# Patient Record
Sex: MALE | Race: WHITE | NOT HISPANIC OR LATINO | Employment: FULL TIME | URBAN - METROPOLITAN AREA
[De-identification: names, ages, dates, MRNs, and addresses within clinical notes are randomized per-mention and may not be internally consistent; named-entity substitution may affect disease eponyms.]

---

## 2018-07-26 ENCOUNTER — APPOINTMENT (OUTPATIENT)
Dept: GENERAL RADIOLOGY | Facility: CLINIC | Age: 23
End: 2018-07-26
Attending: PHYSICIAN ASSISTANT
Payer: COMMERCIAL

## 2018-07-26 ENCOUNTER — HOSPITAL ENCOUNTER (EMERGENCY)
Facility: CLINIC | Age: 23
Discharge: HOME | End: 2018-07-26
Attending: EMERGENCY MEDICINE | Admitting: EMERGENCY MEDICINE
Payer: COMMERCIAL

## 2018-07-26 VITALS
RESPIRATION RATE: 20 BRPM | BODY MASS INDEX: 25.2 KG/M2 | WEIGHT: 180 LBS | DIASTOLIC BLOOD PRESSURE: 75 MMHG | SYSTOLIC BLOOD PRESSURE: 140 MMHG | HEIGHT: 71 IN | OXYGEN SATURATION: 97 % | HEART RATE: 98 BPM | TEMPERATURE: 98.7 F

## 2018-07-26 DIAGNOSIS — S93.422A SPRAIN OF DELTOID LIGAMENT OF LEFT ANKLE, INITIAL ENCOUNTER: ICD-10-CM

## 2018-07-26 PROCEDURE — 25000132 ZZH RX MED GY IP 250 OP 250 PS 637: Performed by: PHYSICIAN ASSISTANT

## 2018-07-26 PROCEDURE — 73590 X-RAY EXAM OF LOWER LEG: CPT | Mod: RT

## 2018-07-26 PROCEDURE — 29515 APPLICATION SHORT LEG SPLINT: CPT | Mod: LT

## 2018-07-26 PROCEDURE — 73610 X-RAY EXAM OF ANKLE: CPT | Mod: RT

## 2018-07-26 PROCEDURE — 99284 EMERGENCY DEPT VISIT MOD MDM: CPT | Mod: 25

## 2018-07-26 RX ORDER — OXYCODONE HYDROCHLORIDE 5 MG/1
5-10 TABLET ORAL EVERY 6 HOURS PRN
Qty: 12 TABLET | Refills: 0 | Status: SHIPPED | OUTPATIENT
Start: 2018-07-26

## 2018-07-26 RX ORDER — ACETAMINOPHEN 325 MG/1
975 TABLET ORAL ONCE
Status: COMPLETED | OUTPATIENT
Start: 2018-07-26 | End: 2018-07-26

## 2018-07-26 RX ORDER — IBUPROFEN 600 MG/1
600 TABLET, FILM COATED ORAL ONCE
Status: COMPLETED | OUTPATIENT
Start: 2018-07-26 | End: 2018-07-26

## 2018-07-26 RX ADMIN — IBUPROFEN 600 MG: 600 TABLET, FILM COATED ORAL at 03:58

## 2018-07-26 RX ADMIN — ACETAMINOPHEN 975 MG: 325 TABLET, FILM COATED ORAL at 03:58

## 2018-07-26 ASSESSMENT — ENCOUNTER SYMPTOMS: ARTHRALGIAS: 1

## 2018-07-26 NOTE — ED PROVIDER NOTES
"  History   Chief Complaint:  Ankle Pain    HPI   Valerie Webster is an otherwise healthy 23 year old male who presents with ankle pain. The patient reports that while playing softball at 2100 tonight he twisted his right ankle while sliding into base.  Patient states that he planted his foot too early causing his weight to propel forward.  He notes this was painful, and he was unable to walk on the foot after the injury. He states the pain has worsened now, prompting him to come to the ED. The patient denies knee pain.     Allergies:  No Known Drug Allergies     Medications:    The patient is currently on no regular medications.     Past Medical History:    History reviewed. No pertinent past medical history.    Past Surgical History:    History reviewed. No pertinent surgical history.    Family History:    History reviewed. No pertinent family history.     Social History:  Alcohol Use: Yes  Marital Status: Single     Review of Systems   Musculoskeletal: Positive for arthralgias (Right ankle, negative for right knee).   All other systems reviewed and are negative.    Physical Exam   Patient Vitals for the past 24 hrs:   BP Temp Temp src Pulse Resp SpO2 Height Weight   07/26/18 0330 140/75 98.7  F (37.1  C) Temporal 98 20 97 % 1.803 m (5' 11\") 81.6 kg (180 lb)     Physical Exam  MSK:  Focused exam of the right ankle shows focal tenderness/swelling over the medial malleolus and deltoid ligament.  Tenderness to palpation over distal tibia and fibula, as well as mid tibia.  Tenderness to palpation over Achilles tendon but intact to palpation.  Negative Machado test.  No tenderness to palpation over bones of foot, calcaneus    SKIN:  Warm and dry with strong DP pulse and normal capillary refill.    NEURO:  Normal sensation throughout the foot and ankle.  Strength limited secondary to pain.    PSYCH:  Normal affect      Emergency Department Course   Imaging:  Radiographic findings were communicated with the patient " who voiced understanding of the findings.    XR Tibia & Fibula Right 2 Views:  No visualized acute fracture, malalignment or other acute  osseous abnormality of the right tibia, fibula or ankle.  As read by Radiology.    XR Ankle Right 3 Views:  No visualized acute fracture, malalignment or other acute  osseous abnormality of the right tibia, fibula or ankle.  As read by Radiology.    Interventions:  0358: Tylenol 975 mg PO  0358: Ibuprofen 600 mg PO    Emergency Department Course:  Past medical records, nursing notes, and vitals reviewed.  0341: I performed an exam of the patient and obtained history, as documented above.  The patient was sent for a tibia & fibula x-ray and right ankle x-ray while in the emergency department, findings above.    0445: I rechecked the patient. Explained findings to patient.    Findings and plan explained to the patient. Patient discharged home with instructions regarding supportive care, medications, and reasons to return. The importance of close follow-up was reviewed.     Impression & Plan    Medical Decision Making:  Valerie Webster is a 24 yo male who presents with ankle pain and swelling.  Patient history and records reviewed.  X-rays were obtained as above and negative for fracture or dislocation.  On examination the patient has swelling over deltoid ligament and tenderness to palpation in this region as well as diffusely throughout the posterior and medial high ankle.  Exam of foot and knee appears intact with no evidence of fracture or dislocation and there is no evidence of disruption of the Achilles tendon.    Patient was given symptomatic treatment as above.  He was placed in a gel splint of the ankle and given crutches with instruction to follow-up with orthopedics if symptoms not improving over the next 5-6 days.  He was discharged home with prescription for pain medication as below.  Return to ED if new or worsening symptoms.    Diagnosis:    ICD-10-CM   1. Sprain of  deltoid ligament of left ankle, initial encounter S93.422A       Disposition:  Discharged to home.    Discharge Medications:  New Prescriptions    OXYCODONE IR (ROXICODONE) 5 MG TABLET    Take 1-2 tablets (5-10 mg) by mouth every 6 hours as needed for pain         Rob Majano  7/26/2018   [facility] EMERGENCY DEPARTMENT  IRob, aracely serving as a scribe at 3:41 AM on 7/26/2018 to document services personally performed by Mic Krishnamurthy PA-C  based on my observations and the provider's statements to me.        Mic Krishnamurthy PA-C  07/26/18 0522

## 2018-07-26 NOTE — ED AVS SNAPSHOT
[facility] [place] [facility]    [address] [place] [address]    Phone:  [phone]    Fax:  [phone]                                       Valerie Webster   MRN: [id number]    Department:  [facility] [place] [facility]   Date of Visit:  7/26/2018           Patient Information     Date Of Birth          1995        Your diagnoses for this visit were:     Sprain of deltoid ligament of left ankle, initial encounter        You were seen by Antonio Norris MD.        Discharge Instructions       Please make an appointment to follow up with [facility] [phone] in 5-7 days if not improving.    When able, elevate your ankle above the level of your heart to help with swelling    Use ice bags for 15-20 minutes every 1-2 hours for next 12-24 hours to help with swelling    You can also use an ace bandage for comfort and help with swelling    Avoid activities that cause increased pain     gentle range of motion is okay, and actually helpful to get you back to work/school faster.    Weight bear as tolerated by pain, use crutches/boot until then        Use tylenol and/or ibuprofen for pain or discomfort    Use Oxycodone for severe pain uncontrolled by above medications    Opioid Medication Information  You have been given a prescription for an opioid (narcotic) pain medicine and/or have received a pain medicine while here in the emergency department. These medicines can make you drowsy or impaired. You must not drive, operate dangerous equipment, or engage in any other dangerous activities while taking these medications. If you drive while taking these medications, you could be arrested for DUI, or driving under the influence. Do not drink any alcohol while you are taking these medications.   Opioid pain medications can cause addiction. If you have a history of chemical dependency of any type, you are at a higher risk of becoming addicted to  pain medications. Only take these prescribed medications to treat your pain when all other options have been tried. Take it for as short a time and as few doses as possible. Store your pain pills in a secure place, as they are frequently stolen and provide a dangerous opportunity for children or visitors in your house to start abusing these powerful medications. We will not replace any lost or stolen medicine. As soon as your pain is better, you should flush all your remaining medication.   Many prescription pain medications contain Tylenol (acetaminophen), including Vicodin, Tylenol #3, Norco, Lortab, and Percocet. You should not take any extra pills of Tylenol if you are using these prescription medications or you can get very sick. Do not ever take more than 4000 mg of acetaminophen in any 24 hour period.  All opioids tend to cause constipation. Drink plenty of water and eat foods that have a lot of fiber, such as fruits, vegetables, prune juice, apple juice and high fiber cereal. Take a laxative if you don t move your bowels at least every other day. Miralax, Milk of Magnesia, Colace, or Senna can be used to keep you regular.            Discharge Instructions  Ankle Sprain    An ankle sprain is a stretching or tearing of a ligament around your ankle joint. In most cases, we recommend resting the ankle for about 3 days, followed by return to activity. Some severe sprains need longer periods of rest, or can require a cast or boot to immobilize them.    Generally, every Emergency Department visit should have a follow-up clinic visit with either a primary or a specialty clinic/provider. Please follow-up as instructed by your emergency provider today.    Return to the Emergency Department if:    Your pain is much worse, or if there is pain in a new area.    Your foot or leg becomes pale, cool, blue, or numb or tingling.    There is anything concerning to you about how your ankle looks.    Any splint or device is  feeling too tight, causing pain, or rubbing into your skin.    Follow-up with your provider:    As recommended by your emergency provider.    If your ankle is not back to normal within about 1 week.    If you are involved in significant athletic activities.        Treatment:    Apply ice your injured area for 15 minutes at a time, at least 3 times a day for the first 1-2 days. Use a cloth between the ice bag and your skin to prevent frostbite.     Do not sleep with an ice pack or heating pad on, since this can cause burns or skin injury.    Raise the injured area above the level of your heart as much as possible in the first 1-2 days.    Pain medications -- You may take a pain medication such as Tylenol  (acetaminophen), Advil , Nuprin  (ibuprofen) or Aleve  (naproxen).    Splint. We often give a stirrup-shaped ankle splint to support your ankle and prevent it from turning again. Wear this all the time for the first 3-5 days, and then as directed by your provider.    Crutches. If you cannot put weight on the ankle without a lot of pain, we recommend crutches. You can put as much weight on the ankle as possible without severe pain.     Compression. An elastic bandage (Ace  wrap) can help with pain and swelling. Remove this at least twice a day, and leave it off for several hours if you develop swelling of the foot.     Exercises.  Movements, like rotating the foot in circles, should be started when swelling improves.   If you were given a prescription for medicine here today, be sure to read all of the information (including the package insert) that comes with your prescription.  This will include important information about the medicine, its side effects, and any warnings that you need to know about.  The pharmacist who fills the prescription can provide more information and answer questions you may have about the medicine.  If you have questions or concerns that the pharmacist cannot address, please call or return  to the Emergency Department.  Remember that you can always come back to the Emergency Department if you are not able to see your regular provider in the amount of time listed above, if you get any new symptoms, or if there is anything that worries you.      24 Hour Appointment Hotline       To make an appointment at any [facility], call [phone] ([phone]). If you don't have a family doctor or clinic, we will help you find one. [facility] are conveniently located to serve the needs of you and your family.             Review of your medicines      START taking        Dose / Directions Last dose taken    oxyCODONE IR 5 MG tablet   Commonly known as:  ROXICODONE   Dose:  5-10 mg   Quantity:  12 tablet        Take 1-2 tablets (5-10 mg) by mouth every 6 hours as needed for pain   Refills:  0                Information about OPIOIDS     PRESCRIPTION OPIOIDS: WHAT YOU NEED TO KNOW   We gave you an opioid (narcotic) pain medicine. It is important to manage your pain, but opioids are not always the best choice. You should first try all the other options your care team gave you. Take this medicine for as short a time (and as few doses) as possible.     These medicines have risks:    DO NOT drive when on new or higher doses of pain medicine. These medicines can affect your alertness and reaction times, and you could be arrested for driving under the influence (DUI). If you need to use opioids long-term, talk to your care team about driving.    DO NOT operate heave machinery    DO NOT do any other dangerous activities while taking these medicines.     DO NOT drink any alcohol while taking these medicines.      If the opioid prescribed includes acetaminophen, DO NOT take with any other medicines that contain acetaminophen. Read all labels carefully. Look for the word  acetaminophen  or  Tylenol.  Ask your pharmacist if you have questions or are unsure.    You can get addicted to pain medicines, especially  if you have a history of addiction (chemical, alcohol or substance dependence). Talk to your care team about ways to reduce this risk.    Store your pills in a secure place, locked if possible. We will not replace any lost or stolen medicine. If you don t finish your medicine, please throw away (dispose) as directed by your pharmacist. The [place] [employer] has more information about safe disposal: https://www.pca.state.[place]/living-green/managing-unwanted-medications.     All opioids tend to cause constipation. Drink plenty of water and eat foods that have a lot of fiber, such as fruits, vegetables, prune juice, apple juice and high-fiber cereal. Take a laxative (Miralax, milk of magnesia, Colace, Senna) if you don t move your bowels at least every other day.         Prescriptions were sent or printed at these locations (1 Prescription)                   Other Prescriptions                Printed at Department/Unit printer (1 of 1)         oxyCODONE IR (ROXICODONE) 5 MG tablet                Procedures and tests performed during your visit     XR Ankle Right 3 Views    XR Tibia & Fibula Right 2 Views      Orders Needing Specimen Collection     None      Pending Results     Date and Time Order Name Status Description    7/26/2018 0349 XR Tibia & Fibula Right 2 Views Preliminary     7/26/2018 0349 XR Ankle Right 3 Views Preliminary             Pending Culture Results     No orders found from 7/24/2018 to 7/27/2018.            Pending Results Instructions     If you had any lab results that were not finalized at the time of your Discharge, you can call the ED Lab Result RN at [phone]. You will be contacted by this team for any positive Lab results or changes in treatment. The nurses are available 7 days a week from 10A to 6:30P.  You can leave a message 24 hours per day and they will return your call.        Test Results From Your Hospital Stay        7/26/2018  4:25 AM      Narrative     RIGHT  TIBIA AND FIBULA AND ANKLE 7 VIEWS  7/26/2018 4:12 AM     HISTORY: Tenderness over the distal tibia and fibula.    COMPARISON: None.        Impression     IMPRESSION: No visualized acute fracture, malalignment or other acute  osseous abnormality of the right tibia, fibula or ankle.         7/26/2018  4:25 AM      Narrative     RIGHT TIBIA AND FIBULA AND ANKLE 7 VIEWS  7/26/2018 4:12 AM     HISTORY: Tenderness over the distal tibia and fibula.    COMPARISON: None.        Impression     IMPRESSION: No visualized acute fracture, malalignment or other acute  osseous abnormality of the right tibia, fibula or ankle.                Clinical Quality Measure: Blood Pressure Screening     Your blood pressure was checked while you were in the emergency department today. The last reading we obtained was  BP: 140/75 . Please read the guidelines below about what these numbers mean and what you should do about them.  If your systolic blood pressure (the top number) is less than 120 and your diastolic blood pressure (the bottom number) is less than 80, then your blood pressure is normal. There is nothing more that you need to do about it.  If your systolic blood pressure (the top number) is 120-139 or your diastolic blood pressure (the bottom number) is 80-89, your blood pressure may be higher than it should be. You should have your blood pressure rechecked within a year by a primary care provider.  If your systolic blood pressure (the top number) is 140 or greater or your diastolic blood pressure (the bottom number) is 90 or greater, you may have high blood pressure. High blood pressure is treatable, but if left untreated over time it can put you at risk for heart attack, stroke, or kidney failure. You should have your blood pressure rechecked by a primary care provider within the next 4 weeks.  If your provider in the emergency department today gave you specific instructions to follow-up with your doctor or provider even sooner  "than that, you should follow that instruction and not wait for up to 4 weeks for your follow-up visit.        Thank you for choosing [facility]       Thank you for choosing [facility] for your care. Our goal is always to provide you with excellent care. Hearing back from our patients is one way we can continue to improve our services. Please take a few minutes to complete the written survey that you may receive in the mail after you visit with us. Thank you!        [email or web address] Information     [employer] lets you send messages to your doctor, view your test results, renew your prescriptions, schedule appointments and more. To sign up, go to [email or web address] . Click on \"Log in\" on the left side of the screen, which will take you to the Welcome page. Then click on \"Sign up Now\" on the right side of the page.     You will be asked to enter the access code listed below, as well as some personal information. Please follow the directions to create your username and password.     Your access code is: [email or web address]  Expires: 10/24/2018  4:47 AM     Your access code will  in 90 days. If you need help or a new code, please call your [facility] clinic or [phone].        Care EveryWhere ID     This is your Care EveryWhere ID. This could be used by other organizations to access your [facility] medical records  [id number]        Equal Access to Services     SHIRA MERINO : Hadii joseline ordonezo Sojasminali, waaxda luqadaha, qaybta kaalmada philipp, nydia coker. So [facility] [phone].    ATENCIÓN: Si habla español, tiene a lim disposición servicios gratuitos de asistencia lingüística. Gwendolyn joseph [phone].    We comply with applicable federal civil rights laws and [place] laws. We do not discriminate on the basis of race, color, national origin, age, disability, sex, sexual orientation, or gender identity.            After Visit Summary       This is your record. Keep this with you and show to your " community pharmacist(s) and doctor(s) at your next visit.

## 2018-07-26 NOTE — ED PROVIDER NOTES
Emergency Department Attending Supervision Note  7/26/2018  3:55 AM      I evaluated this patient in conjunction with Advanced Practice Clinician:    Mic Krishnamurthy PA-C    Briefly, the patient presented with left ankle injury that occurred during a softball game sliding into a base.  Pain is located inferior to the medial malleolus of the left ankle no associated injury able to weight-bear since the injury    My exam:    HEENT:  mmm  Neck: supple  CV: ppi, regular   Resp: speaking in full sentences with any resp distress  Ext:  L ANKLE    + TTP over medial malleolus  + TTP over lateral malleolus  noTTP base of 5th MT  noTTP fibular head  +soft tissue swelling present over medial malleolus  This is a closed injury   able to fire foot/toe flexors and extensors  sensation and perfusion intact throughout foot  No significant swelling or defect over the Achilles tendon.  Achilles gastrocsoleus complex intact  Remainder skeletal survey unremarkable    Skin: warm dry well perfused  Neuro: Alert, no gross motor or sensory deficits                    My impression/diagnosis is:  Left deltoid ligament injury radiograph negative discharged home crutches weightbearing as tolerated follow-up with [facility] not improving as expected for ankle sprain      ICD-10-CM    1. Sprain of deltoid ligament of left ankle, initial encounter S93.422A        Antonio Norris MD Walker, Jerome Richard, MD  07/26/18 0572

## 2018-07-26 NOTE — ED TRIAGE NOTES
Pt to ER with c/o right ankle pain pt was playing softball and twisted it last night now pain severe

## 2018-07-26 NOTE — DISCHARGE INSTRUCTIONS
Please make an appointment to follow up with [facility] [phone] in 5-7 days if not improving.    When able, elevate your ankle above the level of your heart to help with swelling    Use ice bags for 15-20 minutes every 1-2 hours for next 12-24 hours to help with swelling    You can also use an ace bandage for comfort and help with swelling    Avoid activities that cause increased pain     gentle range of motion is okay, and actually helpful to get you back to work/school faster.    Weight bear as tolerated by pain, use crutches/boot until then        Use tylenol and/or ibuprofen for pain or discomfort    Use Oxycodone for severe pain uncontrolled by above medications    Opioid Medication Information  You have been given a prescription for an opioid (narcotic) pain medicine and/or have received a pain medicine while here in the emergency department. These medicines can make you drowsy or impaired. You must not drive, operate dangerous equipment, or engage in any other dangerous activities while taking these medications. If you drive while taking these medications, you could be arrested for DUI, or driving under the influence. Do not drink any alcohol while you are taking these medications.   Opioid pain medications can cause addiction. If you have a history of chemical dependency of any type, you are at a higher risk of becoming addicted to pain medications. Only take these prescribed medications to treat your pain when all other options have been tried. Take it for as short a time and as few doses as possible. Store your pain pills in a secure place, as they are frequently stolen and provide a dangerous opportunity for children or visitors in your house to start abusing these powerful medications. We will not replace any lost or stolen medicine. As soon as your pain is better, you should flush all your remaining medication.   Many prescription pain medications contain Tylenol (acetaminophen), including  Vicodin, Tylenol #3, Norco, Lortab, and Percocet. You should not take any extra pills of Tylenol if you are using these prescription medications or you can get very sick. Do not ever take more than 4000 mg of acetaminophen in any 24 hour period.  All opioids tend to cause constipation. Drink plenty of water and eat foods that have a lot of fiber, such as fruits, vegetables, prune juice, apple juice and high fiber cereal. Take a laxative if you don t move your bowels at least every other day. Miralax, Milk of Magnesia, Colace, or Senna can be used to keep you regular.            Discharge Instructions  Ankle Sprain    An ankle sprain is a stretching or tearing of a ligament around your ankle joint. In most cases, we recommend resting the ankle for about 3 days, followed by return to activity. Some severe sprains need longer periods of rest, or can require a cast or boot to immobilize them.    Generally, every Emergency Department visit should have a follow-up clinic visit with either a primary or a specialty clinic/provider. Please follow-up as instructed by your emergency provider today.    Return to the Emergency Department if:    Your pain is much worse, or if there is pain in a new area.    Your foot or leg becomes pale, cool, blue, or numb or tingling.    There is anything concerning to you about how your ankle looks.    Any splint or device is feeling too tight, causing pain, or rubbing into your skin.    Follow-up with your provider:    As recommended by your emergency provider.    If your ankle is not back to normal within about 1 week.    If you are involved in significant athletic activities.        Treatment:    Apply ice your injured area for 15 minutes at a time, at least 3 times a day for the first 1-2 days. Use a cloth between the ice bag and your skin to prevent frostbite.     Do not sleep with an ice pack or heating pad on, since this can cause burns or skin injury.    Raise the injured area above  the level of your heart as much as possible in the first 1-2 days.    Pain medications -- You may take a pain medication such as Tylenol  (acetaminophen), Advil , Nuprin  (ibuprofen) or Aleve  (naproxen).    Splint. We often give a stirrup-shaped ankle splint to support your ankle and prevent it from turning again. Wear this all the time for the first 3-5 days, and then as directed by your provider.    Crutches. If you cannot put weight on the ankle without a lot of pain, we recommend crutches. You can put as much weight on the ankle as possible without severe pain.     Compression. An elastic bandage (Ace  wrap) can help with pain and swelling. Remove this at least twice a day, and leave it off for several hours if you develop swelling of the foot.     Exercises.  Movements, like rotating the foot in circles, should be started when swelling improves.   If you were given a prescription for medicine here today, be sure to read all of the information (including the package insert) that comes with your prescription.  This will include important information about the medicine, its side effects, and any warnings that you need to know about.  The pharmacist who fills the prescription can provide more information and answer questions you may have about the medicine.  If you have questions or concerns that the pharmacist cannot address, please call or return to the Emergency Department.  Remember that you can always come back to the Emergency Department if you are not able to see your regular provider in the amount of time listed above, if you get any new symptoms, or if there is anything that worries you.

## 2018-07-26 NOTE — ED AVS SNAPSHOT
[facility] Emergency Department    [address]    [facility] [address]    Phone:  [phone]    Fax:  [phone]                                       Valerie Webster   MRN: [id number]    Department:  [facility] Emergency Department   Date of Visit:  7/26/2018           After Visit Summary Signature Page     I have received my discharge instructions, and my questions have been answered. I have discussed any challenges I see with this plan with the nurse or doctor.    ..........................................................................................................................................  Patient/Patient Representative Signature      ..........................................................................................................................................  Patient Representative Print Name and Relationship to Patient    ..................................................               ................................................  Date                                            Time    ..........................................................................................................................................  Reviewed by Signature/Title    ...................................................              ..............................................  Date                                                            Time

## 2021-10-12 ENCOUNTER — APPOINTMENT (OUTPATIENT)
Dept: LAB | Facility: CLINIC | Age: 26
End: 2021-10-12
Payer: COMMERCIAL

## 2021-10-12 DIAGNOSIS — Z20.822 CONTACT WITH AND (SUSPECTED) EXPOSURE TO COVID-19: ICD-10-CM

## 2021-10-12 LAB — SARS-COV-2 RNA RESP QL NAA+PROBE: NEGATIVE

## 2021-10-12 PROCEDURE — U0005 INFEC AGEN DETEC AMPLI PROBE: HCPCS | Performed by: FAMILY MEDICINE

## 2021-10-12 PROCEDURE — C9803 HOPD COVID-19 SPEC COLLECT: HCPCS | Performed by: FAMILY MEDICINE

## 2021-10-12 PROCEDURE — 87635 SARS-COV-2 COVID-19 AMP PRB: CPT | Performed by: FAMILY MEDICINE

## 2023-07-09 ENCOUNTER — OFFICE VISIT (OUTPATIENT)
Dept: URGENT CARE | Facility: URGENT CARE | Age: 28
End: 2023-07-09
Payer: COMMERCIAL

## 2023-07-09 VITALS
TEMPERATURE: 98.4 F | DIASTOLIC BLOOD PRESSURE: 73 MMHG | OXYGEN SATURATION: 97 % | HEART RATE: 95 BPM | SYSTOLIC BLOOD PRESSURE: 121 MMHG

## 2023-07-09 DIAGNOSIS — L25.5 DERMATITIS DUE TO PLANTS, INCLUDING POISON IVY, SUMAC, AND OAK: Primary | ICD-10-CM

## 2023-07-09 PROCEDURE — 99203 OFFICE O/P NEW LOW 30 MIN: CPT | Performed by: PHYSICIAN ASSISTANT

## 2023-07-09 RX ORDER — TRIAMCINOLONE ACETONIDE 5 MG/G
CREAM TOPICAL 2 TIMES DAILY
Qty: 45 G | Refills: 0 | Status: SHIPPED | OUTPATIENT
Start: 2023-07-09 | End: 2023-07-19

## 2023-07-09 RX ORDER — PREDNISONE 20 MG/1
40 TABLET ORAL DAILY
Qty: 10 TABLET | Refills: 0 | Status: SHIPPED | OUTPATIENT
Start: 2023-07-09 | End: 2023-07-14

## 2023-07-09 NOTE — PROGRESS NOTES
Assessment & Plan     Dermatitis due to plants, including poison ivy, sumac, and oak  Triamcinolone cream Rx. Prednisone Rx. No concern for secondary bacterial infection today. Patient educational information provided regarding course of symptoms. Follow up if any worsening symptoms. Patient agrees.   - predniSONE (DELTASONE) 20 MG tablet  Dispense: 10 tablet; Refill: 0  - triamcinolone (ARISTOCORT HP) 0.5 % external cream  Dispense: 45 g; Refill: 0       Return in about 1 week (around 7/16/2023) for Symptoms failing to improve.    Karissa Kuhn PA-C  [facility] KAROL Padilla is a 28 year old male who presents to clinic today for the following health issues:  Chief Complaint   Patient presents with     Urgent Care     Derm Problem     Rash on arms and leg-Noticed last weekend-Started small-Now worse-some itching-OTC cream from [facility]      HPI  Patient is presenting to urgent care today with c/o a vesicular rash left knee and right arm. Onset a week ago. Exposure to poison ivy or poison oak. Affected areas are itchy. Treatment tried: calamine lotion. New lesions appearing since yesterday, reason which prompted this visit today. No fever.       Review of Systems  Constitutional, HEENT, cardiovascular, pulmonary, GI, , musculoskeletal, neuro, skin, endocrine and psych systems are negative, except as otherwise noted.      Objective    /73   Pulse 95   Temp 98.4  F (36.9  C) (Tympanic)   SpO2 97%   Physical Exam   GENERAL: healthy, alert and no distress  SKIN:  Vesicular rash noted left knee and right dorsal forearm, a couple lesions noted left arm and neck area. No purulent drainage. No TTP.

## 2023-07-12 ENCOUNTER — NURSE TRIAGE (OUTPATIENT)
Dept: NURSING | Facility: CLINIC | Age: 28
End: 2023-07-12
Payer: COMMERCIAL

## 2023-07-12 NOTE — TELEPHONE ENCOUNTER
Nurse Triage SBAR  Please call patient at [phone] (home)   Patient is questioning if a refill could be added to triamcinolone cream from visit on 7/9/23.  No PCP.     Situation:     Patient calling stating he did not receive any refills on the triamcinolone cream for his poison ivy.    Background:     Seen in  on 7/9/23 diagnosed with poison ivy.    Assessment:     Patient calling stating he was seen in  for poison ivy and advised them he would be traveling and did not want to run out of the cream. Patient stating symptoms are improving. Denies new or changed symptoms.  Patient does not have a PCP at this time.  Current traveling in [place].    Protocol Recommended Disposition:   Advised patient Urgent Cares do not typically provide follow up and follow up visit would typically be advised.   Reviewed AVS to follow up in 1 week if no improvement.  Patient requesting message. Offered to send message and advised patient of options for Virtual Visit option if UC unable to assist further.    Reason for Disposition    [1] Prescription refill request for NON-ESSENTIAL medicine (i.e., no harm to patient if med not taken) AND [2] triager unable to refill per department policy    Additional Information    Negative: New-onset or worsening symptoms, see that guideline (e.g., diarrhea, runny nose, sore throat)    Negative: Medicine question not related to refill or renewal    Negative: Caller (e.g., patient or pharmacist) requesting information about a new medicine    Negative: Caller requesting information unrelated to medicine    Negative: [1] Prescription refill request for ESSENTIAL medicine (i.e., likelihood of harm to patient if not taken) AND [2] triager unable to refill per department policy    Negative: [1] Prescription not at pharmacy AND [2] was prescribed by PCP recently  (Exception: triager has access to EMR and prescription is recorded there. Go to Home Care and confirm for pharmacy.)    Negative:  [1] Pharmacy calling with prescription questions AND [2] triager unable to answer question    Negative: Caller requesting a CONTROLLED substance prescription refill (e.g., narcotics, ADHD medicines)    Negative: Prescription request for new medicine (not a refill)    Protocols used: MEDICATION REFILL AND RENEWAL CALL-A-AH